# Patient Record
Sex: MALE | Race: WHITE
[De-identification: names, ages, dates, MRNs, and addresses within clinical notes are randomized per-mention and may not be internally consistent; named-entity substitution may affect disease eponyms.]

---

## 2017-04-17 ENCOUNTER — HOSPITAL ENCOUNTER (INPATIENT)
Dept: HOSPITAL 10 - REC | Age: 47
LOS: 3 days | Discharge: HOME | DRG: 460 | End: 2017-04-20
Attending: ORTHOPAEDIC SURGERY | Admitting: ORTHOPAEDIC SURGERY
Payer: COMMERCIAL

## 2017-04-17 VITALS — DIASTOLIC BLOOD PRESSURE: 60 MMHG | HEART RATE: 70 BPM | SYSTOLIC BLOOD PRESSURE: 110 MMHG

## 2017-04-17 VITALS — RESPIRATION RATE: 23 BRPM | DIASTOLIC BLOOD PRESSURE: 64 MMHG | HEART RATE: 74 BPM | SYSTOLIC BLOOD PRESSURE: 108 MMHG

## 2017-04-17 VITALS — SYSTOLIC BLOOD PRESSURE: 107 MMHG | HEART RATE: 79 BPM | RESPIRATION RATE: 16 BRPM | DIASTOLIC BLOOD PRESSURE: 61 MMHG

## 2017-04-17 VITALS — RESPIRATION RATE: 18 BRPM | DIASTOLIC BLOOD PRESSURE: 63 MMHG | SYSTOLIC BLOOD PRESSURE: 113 MMHG

## 2017-04-17 VITALS — RESPIRATION RATE: 16 BRPM | SYSTOLIC BLOOD PRESSURE: 94 MMHG | DIASTOLIC BLOOD PRESSURE: 57 MMHG | HEART RATE: 70 BPM

## 2017-04-17 VITALS — HEART RATE: 78 BPM | SYSTOLIC BLOOD PRESSURE: 117 MMHG | DIASTOLIC BLOOD PRESSURE: 69 MMHG

## 2017-04-17 VITALS — SYSTOLIC BLOOD PRESSURE: 115 MMHG | DIASTOLIC BLOOD PRESSURE: 68 MMHG | HEART RATE: 68 BPM | RESPIRATION RATE: 15 BRPM

## 2017-04-17 VITALS — SYSTOLIC BLOOD PRESSURE: 112 MMHG | DIASTOLIC BLOOD PRESSURE: 62 MMHG | HEART RATE: 73 BPM

## 2017-04-17 VITALS — SYSTOLIC BLOOD PRESSURE: 115 MMHG | RESPIRATION RATE: 17 BRPM | HEART RATE: 66 BPM | DIASTOLIC BLOOD PRESSURE: 67 MMHG

## 2017-04-17 VITALS — SYSTOLIC BLOOD PRESSURE: 116 MMHG | RESPIRATION RATE: 10 BRPM | DIASTOLIC BLOOD PRESSURE: 72 MMHG | HEART RATE: 74 BPM

## 2017-04-17 VITALS — RESPIRATION RATE: 14 BRPM | SYSTOLIC BLOOD PRESSURE: 107 MMHG | HEART RATE: 76 BPM | DIASTOLIC BLOOD PRESSURE: 73 MMHG

## 2017-04-17 VITALS — SYSTOLIC BLOOD PRESSURE: 114 MMHG | RESPIRATION RATE: 10 BRPM | HEART RATE: 76 BPM | DIASTOLIC BLOOD PRESSURE: 69 MMHG

## 2017-04-17 VITALS
BODY MASS INDEX: 23.24 KG/M2 | HEIGHT: 71 IN | BODY MASS INDEX: 23.24 KG/M2 | WEIGHT: 166.01 LBS | BODY MASS INDEX: 23.24 KG/M2 | WEIGHT: 166.01 LBS | HEIGHT: 71 IN

## 2017-04-17 VITALS — HEART RATE: 70 BPM | DIASTOLIC BLOOD PRESSURE: 63 MMHG | SYSTOLIC BLOOD PRESSURE: 101 MMHG | RESPIRATION RATE: 18 BRPM

## 2017-04-17 VITALS — SYSTOLIC BLOOD PRESSURE: 124 MMHG | RESPIRATION RATE: 16 BRPM | DIASTOLIC BLOOD PRESSURE: 82 MMHG | HEART RATE: 74 BPM

## 2017-04-17 VITALS — RESPIRATION RATE: 20 BRPM | HEART RATE: 71 BPM | SYSTOLIC BLOOD PRESSURE: 92 MMHG | DIASTOLIC BLOOD PRESSURE: 55 MMHG

## 2017-04-17 VITALS — SYSTOLIC BLOOD PRESSURE: 115 MMHG | DIASTOLIC BLOOD PRESSURE: 69 MMHG | RESPIRATION RATE: 17 BRPM | HEART RATE: 70 BPM

## 2017-04-17 VITALS — DIASTOLIC BLOOD PRESSURE: 59 MMHG | SYSTOLIC BLOOD PRESSURE: 109 MMHG | HEART RATE: 76 BPM

## 2017-04-17 VITALS — HEART RATE: 69 BPM | SYSTOLIC BLOOD PRESSURE: 112 MMHG | DIASTOLIC BLOOD PRESSURE: 64 MMHG

## 2017-04-17 VITALS — HEART RATE: 76 BPM | SYSTOLIC BLOOD PRESSURE: 107 MMHG | DIASTOLIC BLOOD PRESSURE: 58 MMHG

## 2017-04-17 VITALS — SYSTOLIC BLOOD PRESSURE: 114 MMHG | DIASTOLIC BLOOD PRESSURE: 61 MMHG | HEART RATE: 76 BPM

## 2017-04-17 DIAGNOSIS — R51: ICD-10-CM

## 2017-04-17 DIAGNOSIS — M51.37: ICD-10-CM

## 2017-04-17 DIAGNOSIS — M96.1: Primary | ICD-10-CM

## 2017-04-17 DIAGNOSIS — R42: ICD-10-CM

## 2017-04-17 DIAGNOSIS — E03.9: ICD-10-CM

## 2017-04-17 PROCEDURE — L8699 PROSTHETIC IMPLANT NOS: HCPCS

## 2017-04-17 PROCEDURE — 86850 RBC ANTIBODY SCREEN: CPT

## 2017-04-17 PROCEDURE — 87086 URINE CULTURE/COLONY COUNT: CPT

## 2017-04-17 PROCEDURE — 97530 THERAPEUTIC ACTIVITIES: CPT

## 2017-04-17 PROCEDURE — 0SB40ZZ EXCISION OF LUMBOSACRAL DISC, OPEN APPROACH: ICD-10-PCS | Performed by: ORTHOPAEDIC SURGERY

## 2017-04-17 PROCEDURE — 85014 HEMATOCRIT: CPT

## 2017-04-17 PROCEDURE — 86901 BLOOD TYPING SEROLOGIC RH(D): CPT

## 2017-04-17 PROCEDURE — 81003 URINALYSIS AUTO W/O SCOPE: CPT

## 2017-04-17 PROCEDURE — C1713 ANCHOR/SCREW BN/BN,TIS/BN: HCPCS

## 2017-04-17 PROCEDURE — 97116 GAIT TRAINING THERAPY: CPT

## 2017-04-17 PROCEDURE — 97163 PT EVAL HIGH COMPLEX 45 MIN: CPT

## 2017-04-17 PROCEDURE — 86920 COMPATIBILITY TEST SPIN: CPT

## 2017-04-17 PROCEDURE — 81001 URINALYSIS AUTO W/SCOPE: CPT

## 2017-04-17 PROCEDURE — 0SG30AJ FUSION OF LUMBOSACRAL JOINT WITH INTERBODY FUSION DEVICE, POSTERIOR APPROACH, ANTERIOR COLUMN, OPEN APPROACH: ICD-10-PCS | Performed by: ORTHOPAEDIC SURGERY

## 2017-04-17 PROCEDURE — 85018 HEMOGLOBIN: CPT

## 2017-04-17 PROCEDURE — 88304 TISSUE EXAM BY PATHOLOGIST: CPT

## 2017-04-17 PROCEDURE — C9250 ARTISS FIBRIN SEALANT: HCPCS

## 2017-04-17 PROCEDURE — 72114 X-RAY EXAM L-S SPINE BENDING: CPT

## 2017-04-17 PROCEDURE — 86900 BLOOD TYPING SEROLOGIC ABO: CPT

## 2017-04-17 PROCEDURE — 3E0U0GB INTRODUCTION OF RECOMBINANT BONE MORPHOGENETIC PROTEIN INTO JOINTS, OPEN APPROACH: ICD-10-PCS | Performed by: ORTHOPAEDIC SURGERY

## 2017-04-17 PROCEDURE — 80048 BASIC METABOLIC PNL TOTAL CA: CPT

## 2017-04-17 RX ADMIN — RANITIDINE SCH MG: 150 TABLET ORAL at 20:29

## 2017-04-17 RX ADMIN — DIAZEPAM PRN MG: 5 TABLET ORAL at 23:58

## 2017-04-17 RX ADMIN — MORPHINE SULFATE SCH MG: 1 INJECTION, SOLUTION INTRAVENOUS at 12:26

## 2017-04-17 RX ADMIN — HYDROMORPHONE HYDROCHLORIDE PRN MG: 2 INJECTION INTRAMUSCULAR; INTRAVENOUS; SUBCUTANEOUS at 12:29

## 2017-04-17 RX ADMIN — CEFAZOLIN SCH MLS/HR: 1 INJECTION, POWDER, FOR SOLUTION INTRAMUSCULAR; INTRAVENOUS at 12:35

## 2017-04-17 RX ADMIN — CEFAZOLIN SCH MLS/HR: 1 INJECTION, POWDER, FOR SOLUTION INTRAMUSCULAR; INTRAVENOUS at 23:54

## 2017-04-17 RX ADMIN — DEXAMETHASONE SODIUM PHOSPHATE PRN MG: 10 INJECTION, SOLUTION INTRAMUSCULAR; INTRAVENOUS at 17:33

## 2017-04-17 RX ADMIN — MORPHINE SULFATE SCH MG: 1 INJECTION, SOLUTION INTRAVENOUS at 20:37

## 2017-04-17 RX ADMIN — FOLIC ACID SCH MLS/HR: 5 INJECTION, SOLUTION INTRAMUSCULAR; INTRAVENOUS; SUBCUTANEOUS at 21:56

## 2017-04-17 RX ADMIN — CEFAZOLIN SCH MLS/HR: 1 INJECTION, POWDER, FOR SOLUTION INTRAMUSCULAR; INTRAVENOUS at 17:33

## 2017-04-17 RX ADMIN — HYDROMORPHONE HYDROCHLORIDE PRN MG: 2 INJECTION INTRAMUSCULAR; INTRAVENOUS; SUBCUTANEOUS at 12:43

## 2017-04-17 RX ADMIN — HYDROMORPHONE HYDROCHLORIDE PRN MG: 2 INJECTION INTRAMUSCULAR; INTRAVENOUS; SUBCUTANEOUS at 12:17

## 2017-04-17 RX ADMIN — HYDROMORPHONE HYDROCHLORIDE PRN MG: 2 INJECTION INTRAMUSCULAR; INTRAVENOUS; SUBCUTANEOUS at 12:36

## 2017-04-17 RX ADMIN — PYRIDOXINE HYDROCHLORIDE ONE: 100 INJECTION, SOLUTION INTRAMUSCULAR; INTRAVENOUS at 11:51

## 2017-04-17 RX ADMIN — FOLIC ACID SCH MLS/HR: 5 INJECTION, SOLUTION INTRAMUSCULAR; INTRAVENOUS; SUBCUTANEOUS at 14:58

## 2017-04-17 RX ADMIN — PYRIDOXINE HYDROCHLORIDE ONE: 100 INJECTION, SOLUTION INTRAMUSCULAR; INTRAVENOUS at 08:33

## 2017-04-17 RX ADMIN — HYDROMORPHONE HYDROCHLORIDE PRN MG: 2 INJECTION INTRAMUSCULAR; INTRAVENOUS; SUBCUTANEOUS at 12:11

## 2017-04-17 NOTE — OPR
Date/Time of Note


Date/Time of Note


DATE: 4/17/17 


TIME: 12:01





Operative Report


Preoperative Diagnosis


Status post decompressive laminectomy at L5 with postlaminectomy syndrome


Postoperative Diagnosis


Same


Operation Performed


Transforaminal lumbar interbody fusion L5-S1


Redo decompressive laminectomy at L5


Internal fixation from L5 to the sacrum with bilateral pedicle screws and rods


Placement of interbody bone graft, peek cage, and bone morphogenic protein at L5

-S1


Revision of scar (10 cm)


AP and lateral localizing fluoroscopic imaging


Intraoperative nerve monitoring (4 hours)


Surgeon:  ALESSANDRO DINH MD


First assistant:  JORGE HARTMANN


Anesthesia:  general


Anesthesiologist:  RAYMOND DONOVAN MD


Estimated Blood Loss:  other


Specimens


Disc L5-S1


Tubes/Drains


2 medium Hemovac drains employed


Complications:  None


Pt Condition Post Procedure:  stable


Disposition:  PACU


Operative\Procedure Findings


At surgery, degenerative disc disease at L5-S1 was confirmed.











ALESSANDRO DINH MD Apr 17, 2017 12:03

## 2017-04-17 NOTE — RADRPT
PROCEDURE:   Intraoperative imaging of the lumbar spine with fluoroscopy.  

 

CLINICAL INDICATION:   Back pain.  Intraoperative. 

 

TECHNIQUE:   8 images of the lumbar spine were obtained in the operating room with an image intensif
ier.  No radiologist was in attendance.  26.8 seconds of fluoroscopy time was used. 

 

COMPARISON:   No prior study is available for comparison. 

 

FINDINGS:

For the purposes of this report, the last apparent true disc level is considered to be L5-S1.  Based
 on this, posterior surgical instruments are present at L5 and S1.  Final images demonstrate posteri
or fusion with pedicle screws and connecting rods at L5-S1 and an intervertebral cage at L5-S1.

 

IMPRESSION:

1.  Intraoperative imaging of the lumbar spine.

 

RPTAT: QQ

_____________________________________________ 

.Ward Cast MD, MD           Date    Time 

Electronically viewed and signed by .Ward Cast MD, MD on 04/17/2017 13:06 

 

D:  04/17/2017 13:06  T:  04/17/2017 13:06

.R/

## 2017-04-17 NOTE — OPR
DATE OF OPERATION:  04/17/2017

 

 

PREOPERATIVE DIAGNOSIS:  Status post decompressive laminectomy at L5 with post-laminectomy syndrome.

 

POSTOPERATIVE DIAGNOSIS:  Status post decompressive laminectomy at L5 with post-laminectomy syndrome
.

 

OPERATION PERFORMED:

1.  Transforaminal lumbar interbody fusion at L5-S1.

2.  Redo decompressive laminectomy at L5.

3.  Internal fixation L5-S1 with Alphatec pedicle screws and rods.

4.  Placement of interbody cage L5-S1 with local bone graft and the bone morphogenic protein.

5.  Revision of scar (10 cm).

6.  AP and lateral intraoperative fluoroscopy.

7.  Intraoperative nerve monitoring (4 hours).

 

SURGEON Leander Carvajal MD

 

ASSISTANT:  Nikki Gan PA-C

 

ANESTHESIA:  General endotracheal.

 

ANESTHESIOLOGIST:  Mayank Pillai MD

 

ESTIMATED BLOOD LOSS:  350 mL, none replaced.

 

DRAINS:  Two medium Hemovac drains employed.

 

COMPLICATIONS:  None.

 

PERTINENT HISTORY AND PHYSICAL:  This is a 47-year-old male who had a previous lumbar decompression 
at L5 in 2005, but has had recurrent chronic pain in his back and lower extremities which have been 
unrelieved by extensive conservative management.  He has undergone a number of diagnostic studies in
cluding an MRI of the lumbar spine, which demonstrated post-surgical change at L5 and some degenerat
maye disk changes at L5-S1.  Treatment options were discussed with the patient, who elected to procee
d with surgery.

 

OPERATIVE FINDINGS AT SURGERY:  Post-surgical changes at L5 were noted.  The baseline intraoperative
 nerve monitoring revealed a decrease in the left L5 potential of 10%, the right L5 potential of 10%
, the left S1 potential of 40%, and the right S1 potential of 50%.  These all returned to normal at 
the completion of surgery.

 

OPERATIVE PROCEDURE:  With the patient in supine position after satisfactory induction of general en
dotracheal anesthesia by Dr. Pillai, the patient was turned to the prone position onto the [facility] atop the fluoroscopic OSI table.  All pressure points were carefully padded.  Back was prepped a
nd draped in usual sterile fashion.  Athrombic pumps were applied to the legs below the knees to pre
vent venous stasis during and after procedure.  An indwelling Tavares catheter was also placed preoper
atively to facilitate bladder drainage during and after the procedure.

 

A 10 cm incision then carried out midline using the previous lumbar scar for anatomic localization c
entered over the L5 spinous process 10 cm in length, after the skin was infiltrated with 0.25% Erika
ine without epinephrine for postoperative analgesia.  Superficial retractors were placed and hemosta
sis secured with electrocautery.  Throughout the procedure, copious amounts of antibacterial irrigat
ing solution were used to periodically irrigate the wound.  The fascia was incised in midline with a
 hot knife and a subperiosteal dissection carried out at L4 using a hot knife.  Deep retractors were
 placed and deep hemostasis secured with electrocautery.  Since the spinous process of L5 was essent
ially gone, a Kocher clamp was placed on the spinous process of L4 and this was confirmed on the lat
eral imaging.  

 

A dissection was then carried out distally from the L4 spinous process to expose the L5 facet joints
 bilaterally and the sacrum.  The subtotal facetectomy was then carried out at L5-S1 bilaterally.  T
he operating microscope then moved into place.  The S1 root on the left was mobilized medially and p
rotected with Eddei nerve retractor using microdissection technique.  A 15 blade knife used to cu
t a rectangular window in the annulus and posterior longitudinal ligament and multiple degenerative 
disk fragments were harvested with pituitary rongeurs and sent to laboratory for pathologic study.  
The 6, 7, 8, 9 Alphatec disk win were inserted into the L5-S1 disk to remove additional disk fra
gments and the cartilaginous endplate.  The straight and angled rasps were then used to facilitate a
dditional cartilage removal at L5-S1.  A 9 mm lordotic PEEK cage was then selected, and filled with 
an extra small piece of bone morphogenic protein that had been saturating for approximately 45 minut
es prior to insertion.  Multiple morselized bone fragments were placed into the anterior aspect of t
he disk space prior to insertion of the PEEK cage with bone morphogenic protein.  This would later b
e sealed with Evicel to prevent leakage of the bone morphogenic protein into the canal.  With this h
aving been accomplished, the pedicles of L5 and S1 were identified and 2 Alphatec 40 mm long x 5.5 m
m wide variable angle screws were placed into the L5 pedicles bilaterally using the fluoroscopic pam
ging and the AP and lateral planes for guidance.  A 6.5 x 35 mm screw was placed into the right S1 p
edicle, and subsequently, a 6.5 mm wide x 30 mm long screw was placed into the left S1 sacral ala.  
Multiple attempts to place into the pedicle failed to yield electrical isolation.  With final positi
oning of the hardware, there was electrical isolation beyond 15 milliamps of current.  

 

The 45 mm lordotic rods were then used to connect the tulip heads at L5-S1 bilaterally, and the S1 s
crews were tightened using the locking caps to factory specifications, using the torque wrench.  Lili
or to final tightening of the L5 screws, the construct was put under compression.  With the final ha
rdware tightened, AP and lateral final images were taken which demonstrated satisfactory positioning
 of the hardware and the interbody spacer.  The wound was then closed in layers over 2 medium Hemova
c drains using the #1 Stratafix sutures in deep paralumbar musculature and deep fascia of back, 2-0 
Stratafix sutures in subcu tissue, and a 4-0 Vicryl subcuticular cosmetic closing suture on the skin
.  Dermabond and sterile dressings were applied.  Patient having tolerated procedure well, was then 
turned to supine position onto his bed and extubated by Dr. Pillai.  He was transported to the [facility] room in satisfactory condition.  At the conclusion of procedure, sponge, instrument, and needle 
counts were all correct.

 

NEED FOR SURGICAL ASSISTANT:  During this spinal surgical procedure, my assistant was used to retrac
t and protect the spinal nerves and dural sac. My assistant also employed the suction catheters to e
vacuate blood from the surgical field to improve visualization of the neural structures. The assista
nt was medically necessary to facilitate the completion of the surgery in a safe and expeditious man
ner. State of [place] regulations, as well as hospital bylaws, preclude the use of non-licensed [facility] care personnel such as operating room technicians, to perform these functions.  

 

Throughout the procedure, neural monitoring was carried out by [employer] 
including EMG, SSEP and MEP monitoring of the L3, L4, L5, and S1 nerve roots bilaterally along with 
spinal cord potentials.  These were interpreted by neurologist employed by [employer].

 

 

Dictated By: LEANDER CARVAJAL MD

 

TM/NTS

DD:    04/17/2017 12:11:55

DT:    04/17/2017 13:02:36

Conf#: [id number]

DID#:  [id number]

CC: ASA GODOY MD;*EndCC*

## 2017-04-17 NOTE — CONS
DATE OF ADMISSION: 04/17/2017

DATE OF CONSULTATION:  04/17/2017

 

 

 

CONSULTATION:  Postoperative evaluation.

 

Dr. Carvajal:  Thank you very much for allowing me to evaluate this 47-year-old male who underwent l
umbar back surgery.

 

HISTORICAL EVENTS:  As you well know, this patient has had ongoing low back pain and following your 
evaluation and appropriate imaging elected to proceed with back surgery.  Of note, was that he did u
ndergo a laminectomy in 2005.  In the recovery room he is comfortable, denies cough, wheezing, short
ness of breath, nausea, vomiting, abdominal or chest pain. 

 

Underwent cardiology preoperative evaluation including a nuclear stress study that revealed no defin
ite evidence of coronary insufficiency.

 

PAST MEDICAL HISTORY:  Includes:

1.  Hypothyroidism.

2.  Vitamin D deficiency.

 

FAMILY HISTORY:  Not reviewed.

 

SOCIAL HISTORY:  Does not drink or smoke. 

 

MEDICATIONS:

1.  Ranitidine 300 mg per day.

2.  Levothyroxine 50 mcg per day.

3.  Vitamin D3, dose uncertain.

 

PHYSICAL EXAMINATION:

GENERAL:  Comfortable appearing male in no acute distress.

VITAL SIGNS:  /80, pulse 70, respirations are 20, he was afebrile.

EYES:  Extraocular muscles were full.

NOSE, MOUTH, AND THROAT:  Normal.

NECK:  Supple.  There was no jugular venous distention, thyroid enlargement or adenopathy.

LUNGS:  Clear.

HEART:  Rhythm regular, no murmur.  No third or fourth sound.

ABDOMEN:  Nontender.  Liver and spleen were not palpable.  No masses or tenderness were noted.

EXTREMITIES:  No edema.  Calves. Nontender.

 

IMPRESSION:

1.  Stable postop lumbar back surgery.

2.  History of hypothyroidism.  We will continue thyroid replacement.

3.  We will evaluate daily for signs and symptoms of thromboembolic disease despite an appropriate D
VT prophylaxis.

 

 

Dictated By: RAYMOND CONRAD MD

 

MR/NTS

DD:    04/17/2017 13:03:22

DT:    04/17/2017 14:30:56

Conf#: [id number]

DID#:  [id number]

## 2017-04-17 NOTE — HPN
Date/Time of Note


Date/Time of Note


DATE: 4/17/17 


TIME: 06:45





Interval H&P Admission Note


Pt. seen H&P reviewed:  No system changes











ALESSANDRO DINH MD Apr 17, 2017 06:45

## 2017-04-18 VITALS — DIASTOLIC BLOOD PRESSURE: 55 MMHG | SYSTOLIC BLOOD PRESSURE: 110 MMHG | RESPIRATION RATE: 20 BRPM

## 2017-04-18 VITALS — SYSTOLIC BLOOD PRESSURE: 103 MMHG | RESPIRATION RATE: 20 BRPM | DIASTOLIC BLOOD PRESSURE: 57 MMHG

## 2017-04-18 VITALS — SYSTOLIC BLOOD PRESSURE: 124 MMHG | DIASTOLIC BLOOD PRESSURE: 73 MMHG | RESPIRATION RATE: 18 BRPM

## 2017-04-18 LAB
ADD UMIC: YES
ANION GAP SERPL CALC-SCNC: 14 MMOL/L (ref 8–16)
BACTERIA #/AREA URNS HPF: (no result) /[HPF]
BUN SERPL-MCNC: 12 MG/DL (ref 7–20)
CALCIUM SERPL-MCNC: 8.8 MG/DL (ref 8.4–10.2)
CHLORIDE SERPL-SCNC: 99 MMOL/L (ref 97–110)
CO2 SERPL-SCNC: 28 MMOL/L (ref 21–31)
COLOR UR: (no result)
CREAT SERPL-MCNC: 0.99 MG/DL (ref 0.61–1.24)
GLUCOSE SERPL-MCNC: 119 MG/DL (ref 70–220)
GLUCOSE UR STRIP-MCNC: NEGATIVE %
HCT VFR BLD CALC: 31.7 % (ref 42–52)
HGB BLD-MCNC: 10.7 G/DL (ref 14–18)
KETONES UR STRIP.AUTO-MCNC: NEGATIVE MG/DL
NITRITE UR QL STRIP.AUTO: NEGATIVE
POTASSIUM SERPL-SCNC: 3.9 MMOL/L (ref 3.5–5.1)
RBC # UR AUTO: (no result) /UL
RBC #/AREA URNS HPF: (no result) /HPF
SODIUM SERPL-SCNC: 137 MMOL/L (ref 135–144)
URINE BILIRUBIN (DIP): NEGATIVE
URINE TOTAL PROTEIN (DIP): NEGATIVE
UROBILINOGEN UR STRIP-ACNC: (no result) (ref 0.1–1)
WBC # UR STRIP: (no result) /UL

## 2017-04-18 RX ADMIN — DIAZEPAM PRN MG: 5 TABLET ORAL at 14:19

## 2017-04-18 RX ADMIN — DOCUSATE SODIUM SCH MG: 100 CAPSULE, LIQUID FILLED ORAL at 08:37

## 2017-04-18 RX ADMIN — OXYCODONE HYDROCHLORIDE AND ACETAMINOPHEN SCH MG: 500 TABLET ORAL at 08:38

## 2017-04-18 RX ADMIN — CEFAZOLIN SCH MLS/HR: 1 INJECTION, POWDER, FOR SOLUTION INTRAMUSCULAR; INTRAVENOUS at 04:57

## 2017-04-18 RX ADMIN — FERROUS SULFATE TAB 325 MG (65 MG ELEMENTAL FE) SCH MG: 325 (65 FE) TAB at 08:38

## 2017-04-18 RX ADMIN — FOLIC ACID SCH MLS/HR: 5 INJECTION, SOLUTION INTRAMUSCULAR; INTRAVENOUS; SUBCUTANEOUS at 00:00

## 2017-04-18 RX ADMIN — FERROUS SULFATE TAB 325 MG (65 MG ELEMENTAL FE) SCH MG: 325 (65 FE) TAB at 14:17

## 2017-04-18 RX ADMIN — OXYCODONE HYDROCHLORIDE AND ACETAMINOPHEN SCH MG: 500 TABLET ORAL at 21:59

## 2017-04-18 RX ADMIN — DOCUSATE SODIUM SCH MG: 100 CAPSULE, LIQUID FILLED ORAL at 22:00

## 2017-04-18 RX ADMIN — FERROUS SULFATE TAB 325 MG (65 MG ELEMENTAL FE) SCH MG: 325 (65 FE) TAB at 22:00

## 2017-04-18 RX ADMIN — LEVOTHYROXINE SODIUM SCH MCG: 125 TABLET ORAL at 06:26

## 2017-04-18 RX ADMIN — HYDROCODONE BITARTRATE AND ACETAMINOPHEN PRN TAB: 5; 325 TABLET ORAL at 10:23

## 2017-04-18 RX ADMIN — RANITIDINE SCH MG: 150 TABLET ORAL at 08:37

## 2017-04-18 RX ADMIN — HYDROCODONE BITARTRATE AND ACETAMINOPHEN PRN TAB: 5; 325 TABLET ORAL at 19:12

## 2017-04-18 RX ADMIN — DEXAMETHASONE SODIUM PHOSPHATE PRN MG: 10 INJECTION, SOLUTION INTRAMUSCULAR; INTRAVENOUS at 10:22

## 2017-04-18 RX ADMIN — FOLIC ACID SCH MLS/HR: 5 INJECTION, SOLUTION INTRAMUSCULAR; INTRAVENOUS; SUBCUTANEOUS at 07:56

## 2017-04-18 RX ADMIN — FOLIC ACID SCH MLS/HR: 5 INJECTION, SOLUTION INTRAMUSCULAR; INTRAVENOUS; SUBCUTANEOUS at 17:46

## 2017-04-18 RX ADMIN — RANITIDINE SCH MG: 150 TABLET ORAL at 22:00

## 2017-04-18 NOTE — CONS
Date/Time of Note


Date/Time of Note


DATE: 4/18/17 


TIME: 08:37





Assessment/Plan


Assessment/Plan


Additional Assessment/Plan


1. Stable post op lumbar laminectomy


2. Hypothyroidism on replacement


3. Labs rev





Consultation Date/Type/Reason


Admit Date/Time


Apr 17, 2017 at 05:30


Initial Consult Date








Detailed Summary


Respiratory:  No cough, No shortness of breath


Cardiovascular:  No chest pain


Gastrointestinal:  no complaints


Genitourinary:  other (milkd-mod urethral discomfort)


Musculoskeletal:  back pain (mod when attempting to move)





Exam/Review of Systems


Vital Signs


Vitals





 Vital Signs








  Date Time  Temp Pulse Resp B/P Pulse Ox O2 Delivery O2 Flow Rate FiO2


 


4/18/17 07:00 99.8 95 20 110/55 98   


 


4/17/17 20:25      Nasal Cannula 1.0 














 Intake and Output   


 


 4/17/17 4/17/17 4/18/17





 15:00 23:00 07:00


 


Intake Total 1800 ml 500 ml 1640 ml


 


Output Total 510 ml 600 ml 1690 ml


 


Balance 1290 ml -100 ml -50 ml











Exam


Neck:  No jvd


Respiratory:  clear to auscultation


Cardiovascular:  regular rate and rhythm


Gastrointestinal:  soft


Extremities:  No edema (and no calf tend)





Results


Result Diagram:  


4/18/17 0415                                                                   

             4/18/17 0415





Results 24 hrs





Laboratory Tests








Test


  4/18/17


04:15


 


Hemoglobin 10.7  L


 


Hematocrit 31.7  L


 


Sodium Level 137  


 


Potassium Level 3.9  


 


Chloride Level 99  


 


Carbon Dioxide Level 28  


 


Anion Gap 14  


 


Blood Urea Nitrogen 12  


 


Creatinine 0.99  


 


Glucose Level 119  


 


Calcium Level 8.8  











Medications


Medications





 Current Medications


Dextrose/Sodium Chloride (D5-1/2ns) 1,000 ml @  100 mls/hr Q10H IV  Last 

administered on 4/18/17at 00:00; Admin Dose 100 MLS/HR;  Start 4/17/17 at 11:56


Acetaminophen/ Hydrocodone Bitart (Norco (5/325)) 1 tab Q4H  PRN PO PAIN LEVEL 1

-5;  Start 4/17/17 at 12:00;  Status Future Hold


Acetaminophen/ Hydrocodone Bitart (Norco (5/325)) 2 tab Q4H  PRN PO PAIN LEVEL 6

-10;  Start 4/17/17 at 12:00;  Status Future Hold


Zolpidem Tartrate (Ambien) 5 mg HS  PRN PO INSOMNIA;  Start 4/17/17 at 12:00


Prochlorperazine (Compazine) 10 mg Q4H  PRN PO NAUSEA AND/OR VOMITING;  Start 4/ 17/17 at 12:00


Trimethobenzamide HCl (Tigan) 200 mg Q4H  PRN IM NAUSEA AND/OR VOMITING;  Start 

4/17/17 at 12:00


Ondansetron HCl (Zofran Inj) 4 mg Q6H  PRN IV NAUSEA AND/OR VOMITING Last 

administered on 4/17/17at 17:33; Admin Dose 4 MG;  Start 4/17/17 at 12:00


Al Hydrox/Mg Hydrox/Simethicone (Mag-Al Plus) 15 ml Q4H  PRN PO CONSTIPATION;  

Start 4/17/17 at 12:00


Docusate Sodium (Colace) 100 mg BID PO ;  Start 4/18/17 at 09:00


Acetaminophen (Tylenol Tab) 650 mg Q4H  PRN PO TEMP GREATER THAN 101F OR HA 

Last administered on 4/18/17at 06:25; Admin Dose 650 MG;  Start 4/17/17 at 12:00


Ascorbic Acid (Vitamin C) 1,000 mg BID PO ;  Start 4/18/17 at 09:00


Ferrous Sulfate (Ferrous Sulfate (Ec)) 325 mg TID PO ;  Start 4/18/17 at 09:00


Ranitidine HCl (Zantac) 150 mg BID PO  Last administered on 4/17/17at 20:29; 

Admin Dose 150 MG;  Start 4/17/17 at 21:00


Diazepam (Valium) 5 mg Q4H  PRN PO MUSCLE SPASMS Last administered on 4/17/17at 

23:58; Admin Dose 5 MG;  Start 4/17/17 at 12:00


Diazepam (Valium) 5 mg Q4H  PRN IM MUSCLE SPASMS Last administered on 4/18/17at 

04:30; Admin Dose 5 MG;  Start 4/17/17 at 12:00


Phenol (Cepastat Lozenge) 1 lozenge PRN  PRN MT SORE THROAT Last administered 

on 4/18/17at 00:02; Admin Dose 1 LOZENGE;  Start 4/17/17 at 12:00


Bethanechol Chloride (Urecholine) 25 mg PRN  PRN PO UNABLE TO VOID;  Start 4/17/ 17 at 12:00


Diphenhydramine HCl (Benadryl) 50 mg Q6H  PRN PO PRURITUS;  Start 4/17/17 at 12:

00


Morphine Sulfate (morphine)  Q4PCA IV  Last administered on 4/17/17at 20:37; 

Admin Dose 30 MG;  Start 4/17/17 at 12:00


Naloxone HCl (Narcan) 0.2 mg Q2M  PRN IV RR 8 BREATHS/MIN OR LESS;  Start 4/17/ 17 at 12:00


Propranolol HCl (Inderal) 10 mg TID PO ;  Start 4/17/17 at 21:00


Bethanechol Chloride (Urecholine) 25 mg PRN  PRN PO UNABLE TO VOID;  Start 4/18/ 17 at 08:00











RAYMOND CONRAD MD Apr 18, 2017 08:39

## 2017-04-18 NOTE — PN
Date/Time of Note


Date/Time of Note


DATE: 4/18/17 


TIME: 07:08





Assessment/Plan


Lines/Catheters


IV Catheter Type (from Nrsg):  Peripheral IV


Tavares in Place (from Nrsg):  Yes





Subjective


24 Hr Interval Summary


 Patient is postop day #1 following lumbar decompression/ fusion L5-S1.  Pain 

is moderate overnight.  Vital signs are stable.  Hemoglobin this morning is 10.7

, Hemovac drain overnight was 90 cc and this will continue to be monitored.  

Neurovascular structures are intact distally. Plan for today is to progress 

ambulation with physical therapy, D/C Tavares and PCA





Exam/Review of Systems


Vital Signs


Vitals





 Vital Signs








  Date Time  Temp Pulse Resp B/P Pulse Ox O2 Delivery O2 Flow Rate FiO2


 


4/18/17 05:00   18     


 


4/18/17 00:04 99.0 82  103/57 99   


 


4/17/17 20:25      Nasal Cannula 1.0 














 Intake and Output   


 


 4/17/17 4/17/17 4/18/17





 15:00 23:00 07:00


 


Intake Total 1800 ml 500 ml 1640 ml


 


Output Total 510 ml 600 ml 1690 ml


 


Balance 1290 ml -100 ml -50 ml











Results


Result Diagram:  


4/18/17 0415                                                                   

             4/18/17 0415














JORGE HARTMANN Apr 18, 2017 07:10

## 2017-04-19 VITALS — SYSTOLIC BLOOD PRESSURE: 110 MMHG | RESPIRATION RATE: 16 BRPM | DIASTOLIC BLOOD PRESSURE: 67 MMHG

## 2017-04-19 VITALS — RESPIRATION RATE: 18 BRPM | SYSTOLIC BLOOD PRESSURE: 109 MMHG | DIASTOLIC BLOOD PRESSURE: 73 MMHG

## 2017-04-19 VITALS — RESPIRATION RATE: 18 BRPM | DIASTOLIC BLOOD PRESSURE: 69 MMHG | SYSTOLIC BLOOD PRESSURE: 116 MMHG

## 2017-04-19 VITALS — RESPIRATION RATE: 16 BRPM | DIASTOLIC BLOOD PRESSURE: 71 MMHG | SYSTOLIC BLOOD PRESSURE: 122 MMHG

## 2017-04-19 RX ADMIN — OXYCODONE HYDROCHLORIDE AND ACETAMINOPHEN SCH MG: 500 TABLET ORAL at 20:11

## 2017-04-19 RX ADMIN — DOCUSATE SODIUM SCH MG: 100 CAPSULE, LIQUID FILLED ORAL at 20:11

## 2017-04-19 RX ADMIN — LEVOTHYROXINE SODIUM SCH MCG: 125 TABLET ORAL at 05:58

## 2017-04-19 RX ADMIN — HYDROCODONE BITARTRATE AND ACETAMINOPHEN PRN TAB: 5; 325 TABLET ORAL at 19:43

## 2017-04-19 RX ADMIN — FERROUS SULFATE TAB 325 MG (65 MG ELEMENTAL FE) SCH MG: 325 (65 FE) TAB at 20:11

## 2017-04-19 RX ADMIN — OXYCODONE HYDROCHLORIDE AND ACETAMINOPHEN SCH MG: 500 TABLET ORAL at 08:58

## 2017-04-19 RX ADMIN — HYDROCODONE BITARTRATE AND ACETAMINOPHEN PRN TAB: 5; 325 TABLET ORAL at 02:11

## 2017-04-19 RX ADMIN — RANITIDINE SCH MG: 150 TABLET ORAL at 08:58

## 2017-04-19 RX ADMIN — FOLIC ACID SCH MLS/HR: 5 INJECTION, SOLUTION INTRAMUSCULAR; INTRAVENOUS; SUBCUTANEOUS at 23:56

## 2017-04-19 RX ADMIN — FERROUS SULFATE TAB 325 MG (65 MG ELEMENTAL FE) SCH MG: 325 (65 FE) TAB at 13:16

## 2017-04-19 RX ADMIN — RANITIDINE SCH MG: 150 TABLET ORAL at 20:11

## 2017-04-19 RX ADMIN — FOLIC ACID SCH MLS/HR: 5 INJECTION, SOLUTION INTRAMUSCULAR; INTRAVENOUS; SUBCUTANEOUS at 03:56

## 2017-04-19 RX ADMIN — HYDROCODONE BITARTRATE AND ACETAMINOPHEN PRN TAB: 5; 325 TABLET ORAL at 07:00

## 2017-04-19 RX ADMIN — DIAZEPAM PRN MG: 5 TABLET ORAL at 22:34

## 2017-04-19 RX ADMIN — DOCUSATE SODIUM SCH MG: 100 CAPSULE, LIQUID FILLED ORAL at 08:58

## 2017-04-19 RX ADMIN — FERROUS SULFATE TAB 325 MG (65 MG ELEMENTAL FE) SCH MG: 325 (65 FE) TAB at 08:58

## 2017-04-19 RX ADMIN — HYDROCODONE BITARTRATE AND ACETAMINOPHEN PRN TAB: 5; 325 TABLET ORAL at 09:35

## 2017-04-19 RX ADMIN — FOLIC ACID SCH MLS/HR: 5 INJECTION, SOLUTION INTRAMUSCULAR; INTRAVENOUS; SUBCUTANEOUS at 13:16

## 2017-04-19 NOTE — PN
Date/Time of Note


Date/Time of Note


DATE: 4/19/17 


TIME: 06:54





Assessment/Plan


Lines/Catheters


IV Catheter Type (from Nrsg):  Peripheral IV


Tavares in Place (from Nrsg):  Yes





Subjective


24 Hr Interval Summary


The patient is postop day #2 following a transforaminal lumbar interbody fusion 

at L5-S1.  He is resting comfortably.  Neurovascular structures were intact 

distally.  His Hemovac had minimal drainage and was discontinued.  His wound is 

clean and dry and was redressed.  He made very little progress with physical 

therapy and has not ambulated outside of his room thus far.  Physical therapy 

will continue working with him until he is independent.  He had a low-grade 

temperature last night (100.2).





Exam/Review of Systems


Vital Signs


Vitals





 Vital Signs








  Date Time  Temp Pulse Resp B/P Pulse Ox O2 Delivery O2 Flow Rate FiO2


 


4/18/17 19:20 100.2 98 18 124/73 95   


 


4/17/17 20:25      Nasal Cannula 1.0 














 Intake and Output   


 


 4/18/17 4/18/17 4/19/17





 15:00 23:00 07:00


 


Intake Total 500 ml 1060 ml 1200 ml


 


Output Total  1140 ml 1375 ml


 


Balance 500 ml -80 ml -175 ml











Results


Result Diagram:  


4/18/17 0415                                                                   

             4/18/17 0415














ALESSANDRO DINH MD Apr 19, 2017 06:55

## 2017-04-19 NOTE — CONS
Date/Time of Note


Date/Time of Note


DATE: 4/19/17 


TIME: 11:40





Assessment/Plan


Assessment/Plan


Chief Complaint/Hosp Course


1.He is 2 days post op a lumbar spine surgery


2. He has a headache .


3. continue current medication and PT  .


Problems:  





Consultation Date/Type/Reason


Admit Date/Time


Apr 17, 2017 at 05:30


Initial Consult Date








24 HR Interval Summary


Free Text/Dictation


He is 2 days post op a lumbar spine surgery . He has a headache .





Exam/Review of Systems


Vital Signs


Vitals





 Vital Signs








  Date Time  Temp Pulse Resp B/P Pulse Ox O2 Delivery O2 Flow Rate FiO2


 


4/19/17 07:51 98.2 72 18 109/73 98   


 


4/17/17 20:25      Nasal Cannula 1.0 














 Intake and Output   


 


 4/18/17 4/18/17 4/19/17





 15:00 23:00 07:00


 


Intake Total 500 ml 1060 ml 1200 ml


 


Output Total  1140 ml 1375 ml


 


Balance 500 ml -80 ml -175 ml











Exam


Constitutional:  alert, oriented, well developed


Neck:  non-tender, supple


Respiratory:  clear to auscultation, normal air movement


Cardiovascular:  regular rate and rhythm





Results


Result Diagram:  


4/18/17 0415                                                                   

             4/18/17 0415








Medications


Medications





 Current Medications


Dextrose/Sodium Chloride (D5-1/2ns) 1,000 ml @  100 mls/hr Q10H IV  Last 

administered on 4/18/17at 00:00; Admin Dose 100 MLS/HR;  Start 4/17/17 at 11:56


Acetaminophen/ Hydrocodone Bitart (Norco (5/325)) 1 tab Q4H  PRN PO PAIN LEVEL 1

-5 Last administered on 4/19/17at 09:35; Admin Dose 1 TAB;  Start 4/17/17 at 12:

00;  Status Future hold


Acetaminophen/ Hydrocodone Bitart (Norco (5/325)) 2 tab Q4H  PRN PO PAIN LEVEL 6

-10 Last administered on 4/19/17at 02:11; Admin Dose 2 TAB;  Start 4/17/17 at 12

:00;  Status Future hold


Zolpidem Tartrate (Ambien) 5 mg HS  PRN PO INSOMNIA;  Start 4/17/17 at 12:00


Prochlorperazine (Compazine) 10 mg Q4H  PRN PO NAUSEA AND/OR VOMITING;  Start 4/ 17/17 at 12:00


Trimethobenzamide HCl (Tigan) 200 mg Q4H  PRN IM NAUSEA AND/OR VOMITING;  Start 

4/17/17 at 12:00


Ondansetron HCl (Zofran Inj) 4 mg Q6H  PRN IV NAUSEA AND/OR VOMITING Last 

administered on 4/18/17at 10:22; Admin Dose 4 MG;  Start 4/17/17 at 12:00


Al Hydrox/Mg Hydrox/Simethicone (Mag-Al Plus) 15 ml Q4H  PRN PO CONSTIPATION;  

Start 4/17/17 at 12:00


Docusate Sodium (Colace) 100 mg BID PO  Last administered on 4/19/17at 08:58; 

Admin Dose 100 MG;  Start 4/18/17 at 09:00


Acetaminophen (Tylenol Tab) 650 mg Q4H  PRN PO TEMP GREATER THAN 101F OR HA 

Last administered on 4/18/17at 06:25; Admin Dose 650 MG;  Start 4/17/17 at 12:00


Ascorbic Acid (Vitamin C) 1,000 mg BID PO  Last administered on 4/19/17at 08:58

; Admin Dose 1,000 MG;  Start 4/18/17 at 09:00


Ferrous Sulfate (Ferrous Sulfate (Ec)) 325 mg TID PO  Last administered on 4/19/ 17at 08:58; Admin Dose 325 MG;  Start 4/18/17 at 09:00


Ranitidine HCl (Zantac) 150 mg BID PO  Last administered on 4/19/17at 08:58; 

Admin Dose 150 MG;  Start 4/17/17 at 21:00


Diazepam (Valium) 5 mg Q4H  PRN PO MUSCLE SPASMS Last administered on 4/18/17at 

14:19; Admin Dose 5 MG;  Start 4/17/17 at 12:00


Diazepam (Valium) 5 mg Q4H  PRN IM MUSCLE SPASMS Last administered on 4/18/17at 

04:30; Admin Dose 5 MG;  Start 4/17/17 at 12:00


Phenol (Cepastat Lozenge) 1 lozenge PRN  PRN MT SORE THROAT Last administered 

on 4/18/17at 00:02; Admin Dose 1 LOZENGE;  Start 4/17/17 at 12:00


Diphenhydramine HCl (Benadryl) 50 mg Q6H  PRN PO PRURITUS;  Start 4/17/17 at 12:

00


Morphine Sulfate (morphine)  Q4PCA IV  Last administered on 4/17/17at 20:37; 

Admin Dose 30 MG;  Start 4/17/17 at 12:00


Naloxone HCl (Narcan) 0.2 mg Q2M  PRN IV RR 8 BREATHS/MIN OR LESS;  Start 4/17/ 17 at 12:00


Propranolol HCl (Inderal) 10 mg TID PO  Last administered on 4/18/17at 22:00; 

Admin Dose 10 MG;  Start 4/17/17 at 21:00


Bethanechol Chloride (Urecholine) 25 mg PRN  PRN PO UNABLE TO VOID;  Start 4/18/ 17 at 08:00











ASA GODOY MD Apr 19, 2017 11:47

## 2017-04-20 VITALS — HEART RATE: 70 BPM | SYSTOLIC BLOOD PRESSURE: 105 MMHG | DIASTOLIC BLOOD PRESSURE: 67 MMHG

## 2017-04-20 VITALS — SYSTOLIC BLOOD PRESSURE: 117 MMHG | RESPIRATION RATE: 16 BRPM | DIASTOLIC BLOOD PRESSURE: 74 MMHG

## 2017-04-20 RX ADMIN — DOCUSATE SODIUM SCH MG: 100 CAPSULE, LIQUID FILLED ORAL at 08:50

## 2017-04-20 RX ADMIN — FERROUS SULFATE TAB 325 MG (65 MG ELEMENTAL FE) SCH MG: 325 (65 FE) TAB at 08:50

## 2017-04-20 RX ADMIN — FOLIC ACID SCH MLS/HR: 5 INJECTION, SOLUTION INTRAMUSCULAR; INTRAVENOUS; SUBCUTANEOUS at 09:56

## 2017-04-20 RX ADMIN — FERROUS SULFATE TAB 325 MG (65 MG ELEMENTAL FE) SCH MG: 325 (65 FE) TAB at 12:44

## 2017-04-20 RX ADMIN — OXYCODONE HYDROCHLORIDE AND ACETAMINOPHEN SCH MG: 500 TABLET ORAL at 08:51

## 2017-04-20 RX ADMIN — DIAZEPAM PRN MG: 5 TABLET ORAL at 08:52

## 2017-04-20 RX ADMIN — RANITIDINE SCH MG: 150 TABLET ORAL at 08:50

## 2017-04-20 RX ADMIN — LEVOTHYROXINE SODIUM SCH MCG: 125 TABLET ORAL at 06:05

## 2017-04-20 RX ADMIN — HYDROCODONE BITARTRATE AND ACETAMINOPHEN PRN TAB: 5; 325 TABLET ORAL at 03:59

## 2017-04-20 RX ADMIN — HYDROCODONE BITARTRATE AND ACETAMINOPHEN PRN TAB: 5; 325 TABLET ORAL at 08:52

## 2017-04-20 NOTE — CONS
Date/Time of Note


Date/Time of Note


DATE: 4/20/17 


TIME: 11:17





Assessment/Plan


Assessment/Plan


Chief Complaint/Hosp Course


1.He is 3 days post op a lumbar spine surgery,


2. His headache has resolved.


3. continue current medication and PT  .


4.  He is doing well from my standpoint.  He can be discharged to home today.


Problems:  





Consultation Date/Type/Reason


Admit Date/Time


Apr 17, 2017 at 05:30





24 HR Interval Summary


Free Text/Dictation


He is feeling better today.  His headache has resolved.


Constitutional:  no complaints





Exam/Review of Systems


Vital Signs


Vitals





 Vital Signs








  Date Time  Temp Pulse Resp B/P Pulse Ox O2 Delivery O2 Flow Rate FiO2


 


4/20/17 09:03 98.1  16 117/74 97   


 


4/19/17 19:32  86      


 


4/17/17 20:25      Nasal Cannula 1.0 














 Intake and Output   


 


 4/19/17 4/19/17 4/20/17





 15:00 23:00 07:00


 


Intake Total  820 ml 800 ml


 


Output Total  950 ml 750 ml


 


Balance  -130 ml 50 ml











Exam


Constitutional:  alert, oriented, well developed


Respiratory:  clear to auscultation, normal air movement


Cardiovascular:  regular rate and rhythm


Gastrointestinal:  soft


Musculoskeletal:  nl extremities to inspection





Results


Result Diagram:  


4/18/17 0415                                                                   

             4/18/17 0415








Medications


Medications





 Current Medications


Dextrose/Sodium Chloride (D5-1/2ns) 1,000 ml @  100 mls/hr Q10H IV  Last 

administered on 4/18/17at 00:00; Admin Dose 100 MLS/HR;  Start 4/17/17 at 11:56


Acetaminophen/ Hydrocodone Bitart (Norco (5/325)) 1 tab Q4H  PRN PO PAIN LEVEL 1

-5 Last administered on 4/20/17at 03:59; Admin Dose 1 TAB;  Start 4/17/17 at 12:

00;  Status Future hold


Acetaminophen/ Hydrocodone Bitart (Norco (5/325)) 2 tab Q4H  PRN PO PAIN LEVEL 6

-10 Last administered on 4/20/17at 08:52; Admin Dose 2 TAB;  Start 4/17/17 at 12

:00;  Status Future hold


Zolpidem Tartrate (Ambien) 5 mg HS  PRN PO INSOMNIA;  Start 4/17/17 at 12:00


Prochlorperazine (Compazine) 10 mg Q4H  PRN PO NAUSEA AND/OR VOMITING;  Start 4/ 17/17 at 12:00


Trimethobenzamide HCl (Tigan) 200 mg Q4H  PRN IM NAUSEA AND/OR VOMITING;  Start 

4/17/17 at 12:00


Ondansetron HCl (Zofran Inj) 4 mg Q6H  PRN IV NAUSEA AND/OR VOMITING Last 

administered on 4/18/17at 10:22; Admin Dose 4 MG;  Start 4/17/17 at 12:00


Al Hydrox/Mg Hydrox/Simethicone (Mag-Al Plus) 15 ml Q4H  PRN PO CONSTIPATION;  

Start 4/17/17 at 12:00


Docusate Sodium (Colace) 100 mg BID PO  Last administered on 4/20/17at 08:50; 

Admin Dose 100 MG;  Start 4/18/17 at 09:00


Acetaminophen (Tylenol Tab) 650 mg Q4H  PRN PO TEMP GREATER THAN 101F OR HA 

Last administered on 4/18/17at 06:25; Admin Dose 650 MG;  Start 4/17/17 at 12:00


Ascorbic Acid (Vitamin C) 1,000 mg BID PO  Last administered on 4/20/17at 08:51

; Admin Dose 1,000 MG;  Start 4/18/17 at 09:00


Ferrous Sulfate (Ferrous Sulfate (Ec)) 325 mg TID PO  Last administered on 4/20/ 17at 08:50; Admin Dose 325 MG;  Start 4/18/17 at 09:00


Ranitidine HCl (Zantac) 150 mg BID PO  Last administered on 4/20/17at 08:50; 

Admin Dose 150 MG;  Start 4/17/17 at 21:00


Diazepam (Valium) 5 mg Q4H  PRN PO MUSCLE SPASMS Last administered on 4/20/17at 

08:52; Admin Dose 5 MG;  Start 4/17/17 at 12:00


Diazepam (Valium) 5 mg Q4H  PRN IM MUSCLE SPASMS Last administered on 4/18/17at 

04:30; Admin Dose 5 MG;  Start 4/17/17 at 12:00


Phenol (Cepastat Lozenge) 1 lozenge PRN  PRN MT SORE THROAT Last administered 

on 4/18/17at 00:02; Admin Dose 1 LOZENGE;  Start 4/17/17 at 12:00


Diphenhydramine HCl (Benadryl) 50 mg Q6H  PRN PO PRURITUS;  Start 4/17/17 at 12:

00


Morphine Sulfate (morphine)  Q4PCA IV  Last administered on 4/17/17at 20:37; 

Admin Dose 30 MG;  Start 4/17/17 at 12:00


Naloxone HCl (Narcan) 0.2 mg Q2M  PRN IV RR 8 BREATHS/MIN OR LESS;  Start 4/17/ 17 at 12:00


Propranolol HCl (Inderal) 10 mg TID PO  Last administered on 4/20/17at 08:56; 

Admin Dose 10 MG;  Start 4/17/17 at 21:00


Bethanechol Chloride (Urecholine) 25 mg PRN  PRN PO UNABLE TO VOID;  Start 4/18/ 17 at 08:00











ASA GODOY MD Apr 20, 2017 11:21

## 2017-04-20 NOTE — PN
Date/Time of Note


Date/Time of Note


DATE: 4/20/17 


TIME: 07:09





Assessment/Plan


Lines/Catheters


IV Catheter Type (from Nrsg):  Saline Lock


Tavares in Place (from Nrsg):  No





Subjective


24 Hr Interval Summary


Patient is postop day #3 following a lumbar fusion at L5-S1.  He is resting 

comfortably in bed.  He did not ambulate outside of his room yesterday because 

of dizziness.  I had him up in a chair last night, and he did not have any 

dizziness.  Neurovascular structures are intact distally.  He will be ambulated 

with physical therapy today and hopefully discharge this afternoon if cleared.  

Strict discharge precautions and instructions were given.  We will see him in 

the office in 1-2 weeks.





Exam/Review of Systems


Vital Signs


Vitals





 Vital Signs








  Date Time  Temp Pulse Resp B/P Pulse Ox O2 Delivery O2 Flow Rate FiO2


 


4/19/17 19:32 98.5 86 18 116/69 97   


 


4/17/17 20:25      Nasal Cannula 1.0 














 Intake and Output   


 


 4/19/17 4/19/17 4/20/17





 15:00 23:00 07:00


 


Intake Total  820 ml 800 ml


 


Output Total  950 ml 750 ml


 


Balance  -130 ml 50 ml











Results


Result Diagram:  


4/18/17 0415                                                                   

             4/18/17 0415














ALESSANDRO DINH MD Apr 20, 2017 07:11

## 2017-04-20 NOTE — PDOCDIS
Discharge Instructions


CONDITION


Patient Condition:  Good





HOME CARE INSTRUCTIONS:


Diet Instructions:  RegularSpecial Diet:  N/A





ACTIVITY:








Activity Restrictions:   Rest between Activity





 Avoid heavy lifting





 Do not Drive





 Do not operate Machinery





 Do not operate Power Tool





 Avoid Heavy Housework





Bathing Restrictions:  Shower





FOLLOW UP/APPOINTMENTS


Appointments


ASA Villalba MD Apr 20, 2017 11:22

## 2019-09-13 ENCOUNTER — HOSPITAL ENCOUNTER (OUTPATIENT)
Dept: HOSPITAL 10 - GIL | Age: 49
Discharge: HOME | End: 2019-09-13
Attending: INTERNAL MEDICINE
Payer: COMMERCIAL

## 2019-09-13 ENCOUNTER — HOSPITAL ENCOUNTER (OUTPATIENT)
Dept: HOSPITAL 91 - GIL | Age: 49
Discharge: HOME | End: 2019-09-13
Payer: COMMERCIAL

## 2019-09-13 VITALS
BODY MASS INDEX: 22.69 KG/M2 | HEIGHT: 71 IN | HEIGHT: 71 IN | BODY MASS INDEX: 22.69 KG/M2 | WEIGHT: 162.04 LBS | WEIGHT: 162.04 LBS

## 2019-09-13 VITALS — SYSTOLIC BLOOD PRESSURE: 106 MMHG | RESPIRATION RATE: 19 BRPM | HEART RATE: 58 BPM | DIASTOLIC BLOOD PRESSURE: 71 MMHG

## 2019-09-13 VITALS — SYSTOLIC BLOOD PRESSURE: 125 MMHG | DIASTOLIC BLOOD PRESSURE: 44 MMHG | RESPIRATION RATE: 18 BRPM

## 2019-09-13 VITALS — SYSTOLIC BLOOD PRESSURE: 102 MMHG | RESPIRATION RATE: 20 BRPM | DIASTOLIC BLOOD PRESSURE: 82 MMHG | HEART RATE: 54 BPM

## 2019-09-13 VITALS — SYSTOLIC BLOOD PRESSURE: 106 MMHG | RESPIRATION RATE: 19 BRPM | DIASTOLIC BLOOD PRESSURE: 77 MMHG | HEART RATE: 56 BPM

## 2019-09-13 VITALS — HEART RATE: 56 BPM | DIASTOLIC BLOOD PRESSURE: 81 MMHG | SYSTOLIC BLOOD PRESSURE: 115 MMHG | RESPIRATION RATE: 17 BRPM

## 2019-09-13 VITALS — DIASTOLIC BLOOD PRESSURE: 78 MMHG | SYSTOLIC BLOOD PRESSURE: 117 MMHG | RESPIRATION RATE: 18 BRPM | HEART RATE: 60 BPM

## 2019-09-13 VITALS — SYSTOLIC BLOOD PRESSURE: 105 MMHG | DIASTOLIC BLOOD PRESSURE: 75 MMHG | RESPIRATION RATE: 18 BRPM | HEART RATE: 54 BPM

## 2019-09-13 VITALS — HEART RATE: 58 BPM | DIASTOLIC BLOOD PRESSURE: 81 MMHG | SYSTOLIC BLOOD PRESSURE: 114 MMHG | RESPIRATION RATE: 18 BRPM

## 2019-09-13 VITALS — SYSTOLIC BLOOD PRESSURE: 116 MMHG | RESPIRATION RATE: 19 BRPM | DIASTOLIC BLOOD PRESSURE: 80 MMHG | HEART RATE: 56 BPM

## 2019-09-13 VITALS — HEART RATE: 56 BPM | SYSTOLIC BLOOD PRESSURE: 112 MMHG | RESPIRATION RATE: 20 BRPM | DIASTOLIC BLOOD PRESSURE: 79 MMHG

## 2019-09-13 VITALS — SYSTOLIC BLOOD PRESSURE: 110 MMHG | RESPIRATION RATE: 18 BRPM | DIASTOLIC BLOOD PRESSURE: 78 MMHG | HEART RATE: 58 BPM

## 2019-09-13 DIAGNOSIS — D12.8: ICD-10-CM

## 2019-09-13 DIAGNOSIS — Z12.11: Primary | ICD-10-CM

## 2019-09-13 DIAGNOSIS — E03.9: ICD-10-CM

## 2019-09-13 DIAGNOSIS — D12.3: ICD-10-CM

## 2019-09-13 DIAGNOSIS — D12.5: ICD-10-CM

## 2019-09-13 PROCEDURE — 88305 TISSUE EXAM BY PATHOLOGIST: CPT

## 2019-09-13 PROCEDURE — 45385 COLONOSCOPY W/LESION REMOVAL: CPT
